# Patient Record
Sex: MALE | Race: WHITE | HISPANIC OR LATINO | Employment: FULL TIME | URBAN - METROPOLITAN AREA
[De-identification: names, ages, dates, MRNs, and addresses within clinical notes are randomized per-mention and may not be internally consistent; named-entity substitution may affect disease eponyms.]

---

## 2023-09-23 ENCOUNTER — HOSPITAL ENCOUNTER (EMERGENCY)
Facility: HOSPITAL | Age: 53
Discharge: HOME | End: 2023-09-23
Attending: EMERGENCY MEDICINE
Payer: COMMERCIAL

## 2023-09-23 VITALS
WEIGHT: 216 LBS | SYSTOLIC BLOOD PRESSURE: 132 MMHG | HEIGHT: 70 IN | TEMPERATURE: 98 F | RESPIRATION RATE: 18 BRPM | HEART RATE: 70 BPM | DIASTOLIC BLOOD PRESSURE: 78 MMHG | BODY MASS INDEX: 30.92 KG/M2 | OXYGEN SATURATION: 96 %

## 2023-09-23 DIAGNOSIS — T63.441A BEE STING REACTION, ACCIDENTAL OR UNINTENTIONAL, INITIAL ENCOUNTER: Primary | ICD-10-CM

## 2023-09-23 PROCEDURE — 93005 ELECTROCARDIOGRAM TRACING: CPT | Performed by: EMERGENCY MEDICINE

## 2023-09-23 PROCEDURE — 99283 EMERGENCY DEPT VISIT LOW MDM: CPT

## 2023-09-23 RX ORDER — EPINEPHRINE 0.3 MG/.3ML
0.3 INJECTION SUBCUTANEOUS ONCE
Qty: 1 EACH | Refills: 0 | Status: SHIPPED | OUTPATIENT
Start: 2023-09-24 | End: 2023-09-24

## 2023-09-24 LAB
QT INTERVAL: 360 MS
QTC INTERVAL: 425 MS

## 2023-09-24 NOTE — ED PROVIDER NOTES
"Subjective   History of Present Illness  Chief complaint: Allergic reaction    53-year-old male presents after allergic reaction.  Patient states he was stung by multiple bees this evening while at work.  He went to an urgent care center initially and apparently had low blood pressure at that time.  He was also having pain at the bee sting site.  He was given Solu-Medrol, Benadryl, epi at the urgent care center.  He states he is now feeling much better.  He states he never had any nausea or vomiting.  He denies any tongue or lip swelling.  He has had no difficulty breathing.    History provided by:  Patient    Review of Systems   Constitutional:  Negative for fever.   HENT:  Negative for congestion.    Respiratory:  Negative for cough and shortness of breath.    Cardiovascular:  Negative for chest pain.   Gastrointestinal:  Negative for abdominal pain, nausea and vomiting.   Musculoskeletal:  Negative for back pain.   Skin:  Positive for rash.   Neurological:  Negative for headaches.   Psychiatric/Behavioral:  Negative for confusion.      No past medical history on file.    Not on File    No past surgical history on file.    No family history on file.    Social History     Socioeconomic History    Marital status:        /78   Pulse 74   Temp 97.7 °F (36.5 °C) (Oral)   Resp 18   Ht 177.8 cm (70\")   Wt 98 kg (216 lb)   SpO2 93%   BMI 30.99 kg/m²       Objective   Physical Exam  Vitals and nursing note reviewed.   Constitutional:       Appearance: Normal appearance.   HENT:      Head: Normocephalic and atraumatic.      Mouth/Throat:      Mouth: Mucous membranes are moist.      Comments: No tongue or lip swelling  Cardiovascular:      Rate and Rhythm: Normal rate and regular rhythm.      Heart sounds: Normal heart sounds.   Pulmonary:      Effort: Pulmonary effort is normal. No respiratory distress.      Breath sounds: Normal breath sounds.   Abdominal:      Palpations: Abdomen is soft.      " Tenderness: There is no abdominal tenderness.   Skin:     General: Skin is warm and dry.   Neurological:      Mental Status: He is alert and oriented to person, place, and time.       Procedures           ED Course      My interpretation of EKG shows sinus rhythm, rate of 84, no ST elevation                                     Medical Decision Making  Amount and/or Complexity of Data Reviewed  ECG/medicine tests: ordered.      Patient was observed in the emergency room.  He had no additional allergic reaction symptoms.  EKG is unremarkable.  He is oxygenating well on room air.  He continues to have no tongue or lip swelling.  He states he feels well.  He will be discharged and will follow-up with his primary doctor.  He was instructed to take over-the-counter Benadryl as needed.  He will also be given a prescription for EpiPen.      Final diagnoses:   Bee sting reaction, accidental or unintentional, initial encounter       ED Disposition  ED Disposition       ED Disposition   Discharge    Condition   Stable    Comment   --               Claire Fry, APRN  [address]  [place] [address] [phone]    Call in 2 days           Medication List        New Prescriptions      EPINEPHrine 0.3 MG/0.3ML solution auto-injector injection  Commonly known as: EPIPEN  Inject 0.3 mL under the skin into the appropriate area as directed 1 (One) Time for 1 dose.  Start taking on: September 24, 2023               Where to Get Your Medications        These medications were sent to [id number] - [place], [place] - [address] - [phone]  - [phone] FX  [address] [place] [address]      Phone: [phone]   EPINEPHrine 0.3 MG/0.3ML solution auto-injector injection            Hakan Vences MD  09/23/23 0144

## 2023-09-24 NOTE — DISCHARGE INSTRUCTIONS
Follow-up with your primary doctor.  Return to the emergency room for any new or worsening symptoms or if you have any other questions or concerns.  Take over-the-counter Benadryl as needed.  Keep EpiPen with you at all times.